# Patient Record
Sex: FEMALE | Race: ASIAN | NOT HISPANIC OR LATINO
[De-identification: names, ages, dates, MRNs, and addresses within clinical notes are randomized per-mention and may not be internally consistent; named-entity substitution may affect disease eponyms.]

---

## 2019-03-15 ENCOUNTER — RESULT REVIEW (OUTPATIENT)
Age: 29
End: 2019-03-15

## 2021-06-30 ENCOUNTER — APPOINTMENT (OUTPATIENT)
Dept: OBGYN | Facility: CLINIC | Age: 31
End: 2021-06-30
Payer: COMMERCIAL

## 2021-06-30 VITALS
DIASTOLIC BLOOD PRESSURE: 72 MMHG | SYSTOLIC BLOOD PRESSURE: 110 MMHG | HEIGHT: 66 IN | WEIGHT: 135 LBS | BODY MASS INDEX: 21.69 KG/M2

## 2021-06-30 DIAGNOSIS — N93.8 OTHER SPECIFIED ABNORMAL UTERINE AND VAGINAL BLEEDING: ICD-10-CM

## 2021-06-30 DIAGNOSIS — N84.1 POLYP OF CERVIX UTERI: ICD-10-CM

## 2021-06-30 DIAGNOSIS — Z01.411 ENCOUNTER FOR GYNECOLOGICAL EXAMINATION (GENERAL) (ROUTINE) WITH ABNORMAL FINDINGS: ICD-10-CM

## 2021-06-30 PROBLEM — Z00.00 ENCOUNTER FOR PREVENTIVE HEALTH EXAMINATION: Status: ACTIVE | Noted: 2021-06-30

## 2021-06-30 LAB
BILIRUB UR QL STRIP: NORMAL
CLARITY UR: CLEAR
COLLECTION METHOD: NORMAL
GLUCOSE UR-MCNC: NORMAL
HCG UR QL: 0.2 EU/DL
HGB UR QL STRIP.AUTO: NORMAL
KETONES UR-MCNC: NORMAL
LEUKOCYTE ESTERASE UR QL STRIP: NORMAL
NITRITE UR QL STRIP: NORMAL
PH UR STRIP: 8.5
PROT UR STRIP-MCNC: NORMAL
SP GR UR STRIP: 1.02

## 2021-06-30 PROCEDURE — 99395 PREV VISIT EST AGE 18-39: CPT | Mod: 25

## 2021-06-30 PROCEDURE — 99072 ADDL SUPL MATRL&STAF TM PHE: CPT

## 2021-06-30 PROCEDURE — 81003 URINALYSIS AUTO W/O SCOPE: CPT | Mod: QW

## 2021-06-30 PROCEDURE — 57500 BIOPSY OF CERVIX: CPT | Mod: 59

## 2021-07-05 LAB
25(OH)D3 SERPL-MCNC: 14.4 NG/ML
ALBUMIN SERPL ELPH-MCNC: 5 G/DL
ALP BLD-CCNC: 56 U/L
ALT SERPL-CCNC: 14 U/L
ANION GAP SERPL CALC-SCNC: 20 MMOL/L
AST SERPL-CCNC: 23 U/L
BASOPHILS # BLD AUTO: 0.04 K/UL
BASOPHILS NFR BLD AUTO: 0.5 %
BILIRUB SERPL-MCNC: 0.2 MG/DL
BUN SERPL-MCNC: 10 MG/DL
CALCIUM SERPL-MCNC: 9.7 MG/DL
CHLORIDE SERPL-SCNC: 102 MMOL/L
CHOLEST SERPL-MCNC: 165 MG/DL
CO2 SERPL-SCNC: 19 MMOL/L
CORE LAB BIOPSY: NORMAL
CREAT SERPL-MCNC: 0.76 MG/DL
CYTOLOGY CVX/VAG DOC THIN PREP: NORMAL
EOSINOPHIL # BLD AUTO: 0.09 K/UL
EOSINOPHIL NFR BLD AUTO: 1.2 %
ESTIMATED AVERAGE GLUCOSE: 108 MG/DL
FSH SERPL-MCNC: 4.7 IU/L
GLUCOSE SERPL-MCNC: 44 MG/DL
HBA1C MFR BLD HPLC: 5.4 %
HCT VFR BLD CALC: 40 %
HDLC SERPL-MCNC: 52 MG/DL
HGB BLD-MCNC: 12.2 G/DL
HPV HIGH+LOW RISK DNA PNL CVX: NOT DETECTED
IMM GRANULOCYTES NFR BLD AUTO: 0.3 %
LDLC SERPL CALC-MCNC: 99 MG/DL
LH SERPL-ACNC: 9.8 IU/L
LYMPHOCYTES # BLD AUTO: 1.81 K/UL
LYMPHOCYTES NFR BLD AUTO: 24.3 %
MAN DIFF?: NORMAL
MCHC RBC-ENTMCNC: 26.9 PG
MCHC RBC-ENTMCNC: 30.5 GM/DL
MCV RBC AUTO: 88.1 FL
MONOCYTES # BLD AUTO: 0.55 K/UL
MONOCYTES NFR BLD AUTO: 7.4 %
NEUTROPHILS # BLD AUTO: 4.95 K/UL
NEUTROPHILS NFR BLD AUTO: 66.3 %
NONHDLC SERPL-MCNC: 113 MG/DL
PLATELET # BLD AUTO: 267 K/UL
POTASSIUM SERPL-SCNC: 4.5 MMOL/L
PROLACTIN SERPL-MCNC: 15.8 NG/ML
PROT SERPL-MCNC: 7.5 G/DL
RBC # BLD: 4.54 M/UL
RBC # FLD: 13.9 %
SODIUM SERPL-SCNC: 140 MMOL/L
T4 FREE SERPL-MCNC: 1.5 NG/DL
TRIGL SERPL-MCNC: 67 MG/DL
TSH SERPL-ACNC: 0.78 UIU/ML
WBC # FLD AUTO: 7.46 K/UL

## 2021-07-09 ENCOUNTER — NON-APPOINTMENT (OUTPATIENT)
Age: 31
End: 2021-07-09

## 2021-07-09 DIAGNOSIS — E55.9 VITAMIN D DEFICIENCY, UNSPECIFIED: ICD-10-CM

## 2021-07-11 RX ORDER — CHOLECALCIFEROL (VITAMIN D3) 1250 MCG
1.25 MG CAPSULE ORAL
Qty: 8 | Refills: 0 | Status: ACTIVE | COMMUNITY
Start: 2021-07-09 | End: 1900-01-01

## 2021-07-14 ENCOUNTER — TRANSCRIPTION ENCOUNTER (OUTPATIENT)
Age: 31
End: 2021-07-14

## 2021-08-19 ENCOUNTER — APPOINTMENT (OUTPATIENT)
Dept: OBGYN | Facility: CLINIC | Age: 31
End: 2021-08-19
Payer: COMMERCIAL

## 2021-08-19 ENCOUNTER — ASOB RESULT (OUTPATIENT)
Age: 31
End: 2021-08-19

## 2021-08-19 VITALS — DIASTOLIC BLOOD PRESSURE: 70 MMHG | SYSTOLIC BLOOD PRESSURE: 110 MMHG

## 2021-08-19 DIAGNOSIS — N63.0 UNSPECIFIED LUMP IN UNSPECIFIED BREAST: ICD-10-CM

## 2021-08-19 PROCEDURE — 99213 OFFICE O/P EST LOW 20 MIN: CPT

## 2021-08-19 PROCEDURE — 76830 TRANSVAGINAL US NON-OB: CPT

## 2021-08-19 NOTE — PHYSICAL EXAM
[Examination Of The Breasts] : a normal appearance [Normal] : normal [Breast Palpation Diffuse Fibrous Tissue Bilateral] : fibrocystic changes [___cm] : a ~M [unfilled] ~Ucm subareolar mass was palpated [Superficial] : superficial [Soft] : soft [Rubbery] : rubbery [Tender] : tender [1:00] : in the 1:00 position [2:00] : in the 2:00 position [___cm Radial Distance from the Nipple] : [unfilled] ~Ucm radially from the nipple

## 2021-08-19 NOTE — HISTORY OF PRESENT ILLNESS
[FreeTextEntry1] : 32 yo LMP: 8/1/21. c/o of L breast mass that was tender x 1 week.  Pt thinks it may be related to her period. Denies redness, skin changes, denies nipple changes/discharge.\par \par Of note pt also had a TVUS due to her endometrial polyp:\par Ut 7.05x3.72x3.05\par EM: 7.97mm\par RO: WNL\par LO: WNL\par Normal TVUS\par \par

## 2021-08-19 NOTE — DISCUSSION/SUMMARY
[FreeTextEntry1] : #L breast mass\par -tender, mobile 0.5 cm L subareolar breast mass\par -Changes in nature, likely breast cyst in the setting of fibrocystic breasts\par -Diagnostic mammo and breast US rx\par -Tylenol/Motrin PRN for pain\par \par #h/o endometrial polyp\par -removed in office\par -no Aub\par -TVUS today wnl\par \par Pt to F/u in 1 year or PRN\par ART Meyer MD\par \par

## 2021-08-23 ENCOUNTER — APPOINTMENT (OUTPATIENT)
Dept: MAMMOGRAPHY | Facility: IMAGING CENTER | Age: 31
End: 2021-08-23
Payer: COMMERCIAL

## 2021-08-23 ENCOUNTER — RESULT REVIEW (OUTPATIENT)
Age: 31
End: 2021-08-23

## 2021-08-23 ENCOUNTER — OUTPATIENT (OUTPATIENT)
Dept: OUTPATIENT SERVICES | Facility: HOSPITAL | Age: 31
LOS: 1 days | End: 2021-08-23
Payer: COMMERCIAL

## 2021-08-23 ENCOUNTER — APPOINTMENT (OUTPATIENT)
Dept: ULTRASOUND IMAGING | Facility: IMAGING CENTER | Age: 31
End: 2021-08-23
Payer: COMMERCIAL

## 2021-08-23 DIAGNOSIS — N63.0 UNSPECIFIED LUMP IN UNSPECIFIED BREAST: ICD-10-CM

## 2021-08-23 PROCEDURE — 77066 DX MAMMO INCL CAD BI: CPT | Mod: 26

## 2021-08-23 PROCEDURE — 77066 DX MAMMO INCL CAD BI: CPT

## 2021-08-23 PROCEDURE — 76641 ULTRASOUND BREAST COMPLETE: CPT

## 2021-08-23 PROCEDURE — G0279: CPT | Mod: 26

## 2021-08-23 PROCEDURE — 76641 ULTRASOUND BREAST COMPLETE: CPT | Mod: 26,50

## 2021-08-23 PROCEDURE — G0279: CPT

## 2021-09-27 ENCOUNTER — RX RENEWAL (OUTPATIENT)
Age: 31
End: 2021-09-27

## 2021-09-29 ENCOUNTER — RX RENEWAL (OUTPATIENT)
Age: 31
End: 2021-09-29

## 2021-10-18 ENCOUNTER — FORM ENCOUNTER (OUTPATIENT)
Age: 31
End: 2021-10-18

## 2021-10-19 ENCOUNTER — TRANSCRIPTION ENCOUNTER (OUTPATIENT)
Age: 31
End: 2021-10-19

## 2021-10-20 ENCOUNTER — OUTPATIENT (OUTPATIENT)
Dept: INPATIENT UNIT | Facility: HOSPITAL | Age: 31
LOS: 1 days | End: 2021-10-20
Payer: COMMERCIAL

## 2021-10-20 ENCOUNTER — APPOINTMENT (OUTPATIENT)
Dept: DISASTER EMERGENCY | Facility: HOSPITAL | Age: 31
End: 2021-10-20

## 2021-10-20 VITALS
OXYGEN SATURATION: 100 % | TEMPERATURE: 98 F | SYSTOLIC BLOOD PRESSURE: 105 MMHG | DIASTOLIC BLOOD PRESSURE: 71 MMHG | HEART RATE: 67 BPM | RESPIRATION RATE: 18 BRPM

## 2021-10-20 VITALS
OXYGEN SATURATION: 100 % | HEART RATE: 85 BPM | HEIGHT: 65 IN | DIASTOLIC BLOOD PRESSURE: 70 MMHG | TEMPERATURE: 98 F | SYSTOLIC BLOOD PRESSURE: 118 MMHG | WEIGHT: 151.9 LBS | RESPIRATION RATE: 19 BRPM

## 2021-10-20 DIAGNOSIS — U07.1 COVID-19: ICD-10-CM

## 2021-10-20 PROCEDURE — M0243: CPT

## 2021-10-20 RX ORDER — SODIUM CHLORIDE 9 MG/ML
250 INJECTION INTRAMUSCULAR; INTRAVENOUS; SUBCUTANEOUS
Refills: 0 | Status: COMPLETED | OUTPATIENT
Start: 2021-10-20 | End: 2021-10-20

## 2021-10-20 RX ADMIN — SODIUM CHLORIDE 310 MILLILITER(S): 9 INJECTION INTRAMUSCULAR; INTRAVENOUS; SUBCUTANEOUS at 10:50

## 2021-10-20 NOTE — CHART NOTE - NSCHARTNOTEFT_GEN_A_CORE
CC: Monoclonal Antibody Infusion - COVID 19 Positive      History: Patient presents for infusion of monoclonal antibody infusion. Patient has been screened and was deemed to be a candidate.    Date tested positive: 10/18/21      COVID Symptoms: Yes  Date of onset: 10/14/21  * Fever-  * Cough-  * General Malaise-  * Headache-  * Taste / Smell changes-   * GI symptoms      Risk Profile includes:   * OTHER: BMI >25, race/ethnicity      Vaccination Status: Yes  Date received 2nd dose: Documented by RN      No Known Allergies            PMHx:  Infection due to severe acute respiratory syndrome coronavirus 2 (SARS-CoV-2)    COVID-19          T(C): 36.9 (10-20-21 @ 11:11), Max: 36.9 (10-20-21 @ 11:05)  HR: 68 (10-20-21 @ 11:11) (68 - 85)  BP: 96/69 (10-20-21 @ 11:11) (94/68 - 118/70)  RR: 18 (10-20-21 @ 11:11) (18 - 19)  SpO2: 100% (10-20-21 @ 11:11) (100% - 100%)      PE- Well developed, well-nourish, resting comfortably in NAD.   Neuro- A&Ox3, no gross sensory deficits to light touch or motor weaknesses.   Vasc- No peripheral edema or venous stasis noted.  Skin- No ecchymosis or bleeding.  MS- No bony tenderness       ASSESSMENT:  Pt is a 31y year old Female COVID positive and symptomatic who was referred to the infusion center for Monoclonal antibody infusion (Regeneron).      PLAN:  - infusion procedure explained to patient   - Consent for monoclonal antibody infusion obtained   - Risk & benefits discussed/all questions answered  - infusion of Regeneron   - will observe patient for one hour post infusion  and then if stable discharge home with outpatient follow up as planned by PMD.  - Pregnancy waiver signed    POST INFUSION ASSESSMENT:   DISCHARGE at approximately 1230  hours    - Patient tolerated infusion well denies complaints of chest pain, SOB, dizziness or palpitations  - VSS for discharge home  - D/C instructions given/ fact sheet included.  - Patient to follow-up with PCP as needed.

## 2021-10-21 ENCOUNTER — TRANSCRIPTION ENCOUNTER (OUTPATIENT)
Age: 31
End: 2021-10-21

## 2023-07-05 ENCOUNTER — APPOINTMENT (OUTPATIENT)
Dept: OBGYN | Facility: CLINIC | Age: 33
End: 2023-07-05
Payer: COMMERCIAL

## 2023-07-05 ENCOUNTER — ASOB RESULT (OUTPATIENT)
Age: 33
End: 2023-07-05

## 2023-07-05 VITALS
WEIGHT: 157 LBS | HEIGHT: 66 IN | DIASTOLIC BLOOD PRESSURE: 77 MMHG | SYSTOLIC BLOOD PRESSURE: 115 MMHG | BODY MASS INDEX: 25.23 KG/M2

## 2023-07-05 DIAGNOSIS — Z3A.10 10 WEEKS GESTATION OF PREGNANCY: ICD-10-CM

## 2023-07-05 DIAGNOSIS — Z34.90 ENCOUNTER FOR SUPERVISION OF NORMAL PREGNANCY, UNSPECIFIED, UNSPECIFIED TRIMESTER: ICD-10-CM

## 2023-07-05 PROCEDURE — 0500F INITIAL PRENATAL CARE VISIT: CPT

## 2023-07-05 PROCEDURE — 36415 COLL VENOUS BLD VENIPUNCTURE: CPT

## 2023-07-05 PROCEDURE — 76801 OB US < 14 WKS SINGLE FETUS: CPT

## 2023-07-06 LAB
ABO + RH PNL BLD: NORMAL
ALBUMIN SERPL ELPH-MCNC: 5 G/DL
ALP BLD-CCNC: 48 U/L
ALT SERPL-CCNC: 14 U/L
ANION GAP SERPL CALC-SCNC: 17 MMOL/L
AST SERPL-CCNC: 19 U/L
BILIRUB SERPL-MCNC: 0.3 MG/DL
BLD GP AB SCN SERPL QL: NORMAL
BUN SERPL-MCNC: 6 MG/DL
C TRACH RRNA SPEC QL NAA+PROBE: NOT DETECTED
CALCIUM SERPL-MCNC: 9.6 MG/DL
CHLORIDE SERPL-SCNC: 98 MMOL/L
CO2 SERPL-SCNC: 19 MMOL/L
CREAT SERPL-MCNC: 0.54 MG/DL
EGFR: 125 ML/MIN/1.73M2
GLUCOSE SERPL-MCNC: 63 MG/DL
HBV SURFACE AG SER QL: NONREACTIVE
HCV AB SER QL: NONREACTIVE
HCV S/CO RATIO: 0.11 S/CO
HGB A MFR BLD: 97.3 %
HGB A2 MFR BLD: 2.7 %
HGB FRACT BLD-IMP: NORMAL
HIV1+2 AB SPEC QL IA.RAPID: NONREACTIVE
HPV HIGH+LOW RISK DNA PNL CVX: NOT DETECTED
MEV IGG FLD QL IA: 232 AU/ML
MEV IGG+IGM SER-IMP: POSITIVE
N GONORRHOEA RRNA SPEC QL NAA+PROBE: NOT DETECTED
POTASSIUM SERPL-SCNC: 4.2 MMOL/L
PROT SERPL-MCNC: 7.6 G/DL
RUBV IGG FLD-ACNC: 24.7 INDEX
RUBV IGG SER-IMP: POSITIVE
SODIUM SERPL-SCNC: 134 MMOL/L
SOURCE TP AMPLIFICATION: NORMAL
T PALLIDUM AB SER QL IA: NEGATIVE
T4 FREE SERPL-MCNC: 1.6 NG/DL
TSH SERPL-ACNC: 0.39 UIU/ML
VZV AB TITR SER: POSITIVE
VZV IGG SER IF-ACNC: 3602 INDEX

## 2023-07-08 LAB
BACTERIA UR CULT: NORMAL
CYTOLOGY CVX/VAG DOC THIN PREP: NORMAL
LEAD BLD-MCNC: 1.1 UG/DL

## 2023-07-18 ENCOUNTER — NON-APPOINTMENT (OUTPATIENT)
Age: 33
End: 2023-07-18

## 2023-07-18 ENCOUNTER — ASOB RESULT (OUTPATIENT)
Age: 33
End: 2023-07-18

## 2023-07-18 ENCOUNTER — APPOINTMENT (OUTPATIENT)
Dept: OBGYN | Facility: CLINIC | Age: 33
End: 2023-07-18
Payer: COMMERCIAL

## 2023-07-18 VITALS — BODY MASS INDEX: 25.34 KG/M2 | WEIGHT: 157 LBS | DIASTOLIC BLOOD PRESSURE: 71 MMHG | SYSTOLIC BLOOD PRESSURE: 118 MMHG

## 2023-07-18 DIAGNOSIS — Z3A.12 12 WEEKS GESTATION OF PREGNANCY: ICD-10-CM

## 2023-07-18 PROCEDURE — 76813 OB US NUCHAL MEAS 1 GEST: CPT

## 2023-07-18 PROCEDURE — 0502F SUBSEQUENT PRENATAL CARE: CPT

## 2023-07-21 ENCOUNTER — NON-APPOINTMENT (OUTPATIENT)
Age: 33
End: 2023-07-21

## 2023-08-15 ENCOUNTER — APPOINTMENT (OUTPATIENT)
Dept: OBGYN | Facility: CLINIC | Age: 33
End: 2023-08-15
Payer: COMMERCIAL

## 2023-08-15 ENCOUNTER — ASOB RESULT (OUTPATIENT)
Age: 33
End: 2023-08-15

## 2023-08-15 VITALS — SYSTOLIC BLOOD PRESSURE: 104 MMHG | DIASTOLIC BLOOD PRESSURE: 54 MMHG | WEIGHT: 158 LBS | BODY MASS INDEX: 25.5 KG/M2

## 2023-08-15 DIAGNOSIS — Z3A.16 16 WEEKS GESTATION OF PREGNANCY: ICD-10-CM

## 2023-08-15 PROCEDURE — 76815 OB US LIMITED FETUS(S): CPT

## 2023-08-15 PROCEDURE — 0502F SUBSEQUENT PRENATAL CARE: CPT

## 2023-08-28 LAB
AFP MOM: 0.69
AFP VALUE: 22.7 NG/ML
ALPHA FETOPROTEIN SERUM COMMENT: NORMAL
ALPHA FETOPROTEIN SERUM INTERPRETATION: NORMAL
ALPHA FETOPROTEIN SERUM RESULTS: NORMAL
ALPHA FETOPROTEIN SERUM TEST RESULTS: NORMAL
GESTATIONAL AGE BASED ON: NORMAL
GESTATIONAL AGE ON COLLECTION DATE: 15.9 WEEKS
INSULIN DEP DIABETES: NO
MATERNAL AGE AT EDD AFP: 32.9 YR
MULTIPLE GESTATION: NO
OSBR RISK 1 IN: NORMAL
RACE: NORMAL
WEIGHT AFP: 158 LBS

## 2023-09-15 ENCOUNTER — ASOB RESULT (OUTPATIENT)
Age: 33
End: 2023-09-15

## 2023-09-15 ENCOUNTER — APPOINTMENT (OUTPATIENT)
Dept: OBGYN | Facility: CLINIC | Age: 33
End: 2023-09-15
Payer: COMMERCIAL

## 2023-09-15 VITALS — BODY MASS INDEX: 25.99 KG/M2 | SYSTOLIC BLOOD PRESSURE: 101 MMHG | DIASTOLIC BLOOD PRESSURE: 61 MMHG | WEIGHT: 161 LBS

## 2023-09-15 PROCEDURE — 90714 TD VACC NO PRESV 7 YRS+ IM: CPT

## 2023-09-15 PROCEDURE — 76805 OB US >/= 14 WKS SNGL FETUS: CPT

## 2023-09-15 PROCEDURE — 90471 IMMUNIZATION ADMIN: CPT

## 2023-09-15 PROCEDURE — 90472 IMMUNIZATION ADMIN EACH ADD: CPT

## 2023-09-15 PROCEDURE — 90686 IIV4 VACC NO PRSV 0.5 ML IM: CPT

## 2023-09-15 PROCEDURE — 0502F SUBSEQUENT PRENATAL CARE: CPT

## 2023-09-18 ENCOUNTER — NON-APPOINTMENT (OUTPATIENT)
Age: 33
End: 2023-09-18

## 2023-10-13 ENCOUNTER — APPOINTMENT (OUTPATIENT)
Dept: OBGYN | Facility: CLINIC | Age: 33
End: 2023-10-13
Payer: COMMERCIAL

## 2023-10-13 ENCOUNTER — ASOB RESULT (OUTPATIENT)
Age: 33
End: 2023-10-13

## 2023-10-13 VITALS — WEIGHT: 168 LBS | SYSTOLIC BLOOD PRESSURE: 113 MMHG | BODY MASS INDEX: 27.12 KG/M2 | DIASTOLIC BLOOD PRESSURE: 71 MMHG

## 2023-10-13 PROCEDURE — 0502F SUBSEQUENT PRENATAL CARE: CPT

## 2023-10-13 PROCEDURE — 76816 OB US FOLLOW-UP PER FETUS: CPT

## 2023-11-06 ENCOUNTER — APPOINTMENT (OUTPATIENT)
Dept: OBGYN | Facility: CLINIC | Age: 33
End: 2023-11-06
Payer: COMMERCIAL

## 2023-11-06 ENCOUNTER — ASOB RESULT (OUTPATIENT)
Age: 33
End: 2023-11-06

## 2023-11-06 VITALS
HEIGHT: 66 IN | DIASTOLIC BLOOD PRESSURE: 72 MMHG | SYSTOLIC BLOOD PRESSURE: 113 MMHG | BODY MASS INDEX: 27.32 KG/M2 | WEIGHT: 170 LBS

## 2023-11-06 DIAGNOSIS — Z3A.28 28 WEEKS GESTATION OF PREGNANCY: ICD-10-CM

## 2023-11-06 DIAGNOSIS — Z34.03 ENCOUNTER FOR SUPERVISION OF NORMAL FIRST PREGNANCY, THIRD TRIMESTER: ICD-10-CM

## 2023-11-06 DIAGNOSIS — Z23 ENCOUNTER FOR IMMUNIZATION: ICD-10-CM

## 2023-11-06 LAB
BASOPHILS # BLD AUTO: 0.02 K/UL
BASOPHILS NFR BLD AUTO: 0.2 %
EOSINOPHIL # BLD AUTO: 0.07 K/UL
EOSINOPHIL NFR BLD AUTO: 0.8 %
GLUCOSE 1H P 50 G GLC PO SERPL-MCNC: 103 MG/DL
HCT VFR BLD CALC: 39 %
HGB BLD-MCNC: 12.6 G/DL
IMM GRANULOCYTES NFR BLD AUTO: 0.7 %
LYMPHOCYTES # BLD AUTO: 1.2 K/UL
LYMPHOCYTES NFR BLD AUTO: 13.3 %
MAN DIFF?: NORMAL
MCHC RBC-ENTMCNC: 29.7 PG
MCHC RBC-ENTMCNC: 32.3 GM/DL
MCV RBC AUTO: 92 FL
MONOCYTES # BLD AUTO: 0.36 K/UL
MONOCYTES NFR BLD AUTO: 4 %
NEUTROPHILS # BLD AUTO: 7.31 K/UL
NEUTROPHILS NFR BLD AUTO: 81 %
PLATELET # BLD AUTO: 238 K/UL
RBC # BLD: 4.24 M/UL
RBC # FLD: 13.2 %
WBC # FLD AUTO: 9.02 K/UL

## 2023-11-06 PROCEDURE — 90471 IMMUNIZATION ADMIN: CPT

## 2023-11-06 PROCEDURE — 0502F SUBSEQUENT PRENATAL CARE: CPT

## 2023-11-06 PROCEDURE — 90715 TDAP VACCINE 7 YRS/> IM: CPT

## 2023-11-06 PROCEDURE — 76816 OB US FOLLOW-UP PER FETUS: CPT

## 2023-11-06 PROCEDURE — 36415 COLL VENOUS BLD VENIPUNCTURE: CPT

## 2023-11-07 LAB
BLD GP AB SCN SERPL QL: NORMAL
HIV1+2 AB SPEC QL IA.RAPID: NONREACTIVE

## 2023-11-10 ENCOUNTER — NON-APPOINTMENT (OUTPATIENT)
Age: 33
End: 2023-11-10

## 2023-12-08 ENCOUNTER — ASOB RESULT (OUTPATIENT)
Age: 33
End: 2023-12-08

## 2023-12-08 ENCOUNTER — APPOINTMENT (OUTPATIENT)
Dept: OBGYN | Facility: CLINIC | Age: 33
End: 2023-12-08
Payer: COMMERCIAL

## 2023-12-08 VITALS — WEIGHT: 176 LBS | DIASTOLIC BLOOD PRESSURE: 81 MMHG | BODY MASS INDEX: 28.41 KG/M2 | SYSTOLIC BLOOD PRESSURE: 119 MMHG

## 2023-12-08 DIAGNOSIS — Z3A.32 32 WEEKS GESTATION OF PREGNANCY: ICD-10-CM

## 2023-12-08 PROCEDURE — 76816 OB US FOLLOW-UP PER FETUS: CPT

## 2023-12-08 PROCEDURE — 76819 FETAL BIOPHYS PROFIL W/O NST: CPT

## 2023-12-08 PROCEDURE — 0502F SUBSEQUENT PRENATAL CARE: CPT

## 2023-12-20 ENCOUNTER — NON-APPOINTMENT (OUTPATIENT)
Age: 33
End: 2023-12-20

## 2023-12-20 ENCOUNTER — APPOINTMENT (OUTPATIENT)
Dept: OBGYN | Facility: CLINIC | Age: 33
End: 2023-12-20
Payer: COMMERCIAL

## 2023-12-20 VITALS
HEIGHT: 66 IN | WEIGHT: 178 LBS | DIASTOLIC BLOOD PRESSURE: 81 MMHG | BODY MASS INDEX: 28.61 KG/M2 | SYSTOLIC BLOOD PRESSURE: 120 MMHG

## 2023-12-20 DIAGNOSIS — Z3A.34 34 WEEKS GESTATION OF PREGNANCY: ICD-10-CM

## 2023-12-20 PROCEDURE — 0502F SUBSEQUENT PRENATAL CARE: CPT

## 2023-12-29 ENCOUNTER — ASOB RESULT (OUTPATIENT)
Age: 33
End: 2023-12-29

## 2023-12-29 ENCOUNTER — NON-APPOINTMENT (OUTPATIENT)
Age: 33
End: 2023-12-29

## 2023-12-29 ENCOUNTER — APPOINTMENT (OUTPATIENT)
Dept: OBGYN | Facility: CLINIC | Age: 33
End: 2023-12-29
Payer: COMMERCIAL

## 2023-12-29 VITALS — BODY MASS INDEX: 29.05 KG/M2 | WEIGHT: 180 LBS | DIASTOLIC BLOOD PRESSURE: 79 MMHG | SYSTOLIC BLOOD PRESSURE: 123 MMHG

## 2023-12-29 DIAGNOSIS — Z3A.35 35 WEEKS GESTATION OF PREGNANCY: ICD-10-CM

## 2023-12-29 PROCEDURE — 76819 FETAL BIOPHYS PROFIL W/O NST: CPT

## 2023-12-29 PROCEDURE — 76816 OB US FOLLOW-UP PER FETUS: CPT

## 2023-12-29 PROCEDURE — 0502F SUBSEQUENT PRENATAL CARE: CPT

## 2024-01-04 ENCOUNTER — APPOINTMENT (OUTPATIENT)
Dept: OBGYN | Facility: CLINIC | Age: 34
End: 2024-01-04

## 2024-01-05 ENCOUNTER — APPOINTMENT (OUTPATIENT)
Dept: OBGYN | Facility: CLINIC | Age: 34
End: 2024-01-05
Payer: COMMERCIAL

## 2024-01-05 ENCOUNTER — ASOB RESULT (OUTPATIENT)
Age: 34
End: 2024-01-05

## 2024-01-05 VITALS — SYSTOLIC BLOOD PRESSURE: 120 MMHG | WEIGHT: 180 LBS | BODY MASS INDEX: 29.05 KG/M2 | DIASTOLIC BLOOD PRESSURE: 78 MMHG

## 2024-01-05 DIAGNOSIS — Z3A.36 36 WEEKS GESTATION OF PREGNANCY: ICD-10-CM

## 2024-01-05 DIAGNOSIS — N89.8 OTHER SPECIFIED NONINFLAMMATORY DISORDERS OF VAGINA: ICD-10-CM

## 2024-01-05 PROCEDURE — 76816 OB US FOLLOW-UP PER FETUS: CPT

## 2024-01-05 PROCEDURE — 0502F SUBSEQUENT PRENATAL CARE: CPT

## 2024-01-05 PROCEDURE — 76819 FETAL BIOPHYS PROFIL W/O NST: CPT

## 2024-01-07 ENCOUNTER — NON-APPOINTMENT (OUTPATIENT)
Age: 34
End: 2024-01-07

## 2024-01-07 LAB
GP B STREP DNA SPEC QL NAA+PROBE: DETECTED
SOURCE GBS: NORMAL

## 2024-01-09 ENCOUNTER — NON-APPOINTMENT (OUTPATIENT)
Age: 34
End: 2024-01-09

## 2024-01-09 LAB
BV BACTERIA RRNA VAG QL NAA+PROBE: DETECTED
C GLABRATA RNA VAG QL NAA+PROBE: NOT DETECTED
CANDIDA RRNA VAG QL PROBE: NOT DETECTED
T VAGINALIS RRNA SPEC QL NAA+PROBE: NOT DETECTED

## 2024-01-10 DIAGNOSIS — N76.0 ACUTE VAGINITIS: ICD-10-CM

## 2024-01-10 DIAGNOSIS — B96.89 ACUTE VAGINITIS: ICD-10-CM

## 2024-01-11 RX ORDER — METRONIDAZOLE 500 MG/1
500 TABLET ORAL TWICE DAILY
Qty: 14 | Refills: 0 | Status: ACTIVE | COMMUNITY
Start: 2024-01-10 | End: 1900-01-01

## 2024-01-12 ENCOUNTER — APPOINTMENT (OUTPATIENT)
Dept: OBGYN | Facility: CLINIC | Age: 34
End: 2024-01-12
Payer: COMMERCIAL

## 2024-01-12 ENCOUNTER — ASOB RESULT (OUTPATIENT)
Age: 34
End: 2024-01-12

## 2024-01-12 VITALS — BODY MASS INDEX: 29.54 KG/M2 | SYSTOLIC BLOOD PRESSURE: 122 MMHG | WEIGHT: 183 LBS | DIASTOLIC BLOOD PRESSURE: 82 MMHG

## 2024-01-12 DIAGNOSIS — Z3A.37 37 WEEKS GESTATION OF PREGNANCY: ICD-10-CM

## 2024-01-12 PROCEDURE — 76816 OB US FOLLOW-UP PER FETUS: CPT

## 2024-01-12 PROCEDURE — 0502F SUBSEQUENT PRENATAL CARE: CPT

## 2024-01-17 ENCOUNTER — APPOINTMENT (OUTPATIENT)
Dept: OBGYN | Facility: CLINIC | Age: 34
End: 2024-01-17

## 2024-01-19 ENCOUNTER — APPOINTMENT (OUTPATIENT)
Dept: OBGYN | Facility: CLINIC | Age: 34
End: 2024-01-19
Payer: COMMERCIAL

## 2024-01-19 ENCOUNTER — ASOB RESULT (OUTPATIENT)
Age: 34
End: 2024-01-19

## 2024-01-19 VITALS
WEIGHT: 184 LBS | SYSTOLIC BLOOD PRESSURE: 120 MMHG | DIASTOLIC BLOOD PRESSURE: 82 MMHG | BODY MASS INDEX: 29.57 KG/M2 | HEIGHT: 66 IN

## 2024-01-19 DIAGNOSIS — Z34.93 ENCOUNTER FOR SUPERVISION OF NORMAL PREGNANCY, UNSPECIFIED, THIRD TRIMESTER: ICD-10-CM

## 2024-01-19 DIAGNOSIS — Z3A.38 38 WEEKS GESTATION OF PREGNANCY: ICD-10-CM

## 2024-01-19 PROCEDURE — 76819 FETAL BIOPHYS PROFIL W/O NST: CPT

## 2024-01-19 PROCEDURE — 0502F SUBSEQUENT PRENATAL CARE: CPT

## 2024-01-26 ENCOUNTER — ASOB RESULT (OUTPATIENT)
Age: 34
End: 2024-01-26

## 2024-01-26 ENCOUNTER — APPOINTMENT (OUTPATIENT)
Dept: OBGYN | Facility: CLINIC | Age: 34
End: 2024-01-26

## 2024-01-26 ENCOUNTER — INPATIENT (INPATIENT)
Facility: HOSPITAL | Age: 34
LOS: 1 days | Discharge: HOME | End: 2024-01-28
Attending: SPECIALIST | Admitting: SPECIALIST
Payer: COMMERCIAL

## 2024-01-26 ENCOUNTER — APPOINTMENT (OUTPATIENT)
Dept: OBGYN | Facility: CLINIC | Age: 34
End: 2024-01-26
Payer: COMMERCIAL

## 2024-01-26 VITALS — HEART RATE: 82 BPM | SYSTOLIC BLOOD PRESSURE: 132 MMHG | DIASTOLIC BLOOD PRESSURE: 77 MMHG | OXYGEN SATURATION: 100 %

## 2024-01-26 VITALS — DIASTOLIC BLOOD PRESSURE: 88 MMHG | BODY MASS INDEX: 29.86 KG/M2 | SYSTOLIC BLOOD PRESSURE: 136 MMHG | WEIGHT: 185 LBS

## 2024-01-26 DIAGNOSIS — O26.899 OTHER SPECIFIED PREGNANCY RELATED CONDITIONS, UNSPECIFIED TRIMESTER: ICD-10-CM

## 2024-01-26 DIAGNOSIS — Z34.80 ENCOUNTER FOR SUPERVISION OF OTHER NORMAL PREGNANCY, UNSPECIFIED TRIMESTER: ICD-10-CM

## 2024-01-26 DIAGNOSIS — Z92.89 PERSONAL HISTORY OF OTHER MEDICAL TREATMENT: Chronic | ICD-10-CM

## 2024-01-26 DIAGNOSIS — Z3A.39 39 WEEKS GESTATION OF PREGNANCY: ICD-10-CM

## 2024-01-26 DIAGNOSIS — O13.9 GESTATIONAL [PREGNANCY-INDUCED] HYPERTENSION W/OUT SIGNIFICANT PROTEINURIA, UNSPECIFIED TRIMESTER: ICD-10-CM

## 2024-01-26 LAB
ALBUMIN SERPL ELPH-MCNC: 3.6 G/DL — SIGNIFICANT CHANGE UP (ref 3.3–5)
ALP SERPL-CCNC: 161 U/L — HIGH (ref 40–120)
ALT FLD-CCNC: 15 U/L — SIGNIFICANT CHANGE UP (ref 10–45)
ANION GAP SERPL CALC-SCNC: 10 MMOL/L — SIGNIFICANT CHANGE UP (ref 5–17)
APPEARANCE UR: CLEAR — SIGNIFICANT CHANGE UP
APTT BLD: 26.9 SEC — SIGNIFICANT CHANGE UP (ref 24.5–35.6)
AST SERPL-CCNC: 16 U/L — SIGNIFICANT CHANGE UP (ref 10–40)
BACTERIA # UR AUTO: NEGATIVE /HPF — SIGNIFICANT CHANGE UP
BASOPHILS # BLD AUTO: 0.02 K/UL — SIGNIFICANT CHANGE UP (ref 0–0.2)
BASOPHILS NFR BLD AUTO: 0.2 % — SIGNIFICANT CHANGE UP (ref 0–2)
BILIRUB SERPL-MCNC: 0.2 MG/DL — SIGNIFICANT CHANGE UP (ref 0.2–1.2)
BILIRUB UR-MCNC: NEGATIVE — SIGNIFICANT CHANGE UP
BUN SERPL-MCNC: 7 MG/DL — SIGNIFICANT CHANGE UP (ref 7–23)
CALCIUM SERPL-MCNC: 8.9 MG/DL — SIGNIFICANT CHANGE UP (ref 8.4–10.5)
CAST: 0 /LPF — SIGNIFICANT CHANGE UP (ref 0–4)
CHLORIDE SERPL-SCNC: 105 MMOL/L — SIGNIFICANT CHANGE UP (ref 96–108)
CO2 SERPL-SCNC: 22 MMOL/L — SIGNIFICANT CHANGE UP (ref 22–31)
COLOR SPEC: YELLOW — SIGNIFICANT CHANGE UP
CREAT ?TM UR-MCNC: 66 MG/DL — SIGNIFICANT CHANGE UP
CREAT SERPL-MCNC: 0.55 MG/DL — SIGNIFICANT CHANGE UP (ref 0.5–1.3)
DIFF PNL FLD: ABNORMAL
EGFR: 125 ML/MIN/1.73M2 — SIGNIFICANT CHANGE UP
EOSINOPHIL # BLD AUTO: 0.02 K/UL — SIGNIFICANT CHANGE UP (ref 0–0.5)
EOSINOPHIL NFR BLD AUTO: 0.2 % — SIGNIFICANT CHANGE UP (ref 0–6)
FIBRINOGEN PPP-MCNC: 387 MG/DL — SIGNIFICANT CHANGE UP (ref 200–445)
GLUCOSE SERPL-MCNC: 87 MG/DL — SIGNIFICANT CHANGE UP (ref 70–99)
GLUCOSE UR QL: 100 MG/DL
HCT VFR BLD CALC: 36.1 % — SIGNIFICANT CHANGE UP (ref 34.5–45)
HGB BLD-MCNC: 12.1 G/DL — SIGNIFICANT CHANGE UP (ref 11.5–15.5)
IMM GRANULOCYTES NFR BLD AUTO: 0.6 % — SIGNIFICANT CHANGE UP (ref 0–0.9)
INR BLD: 0.9 RATIO — SIGNIFICANT CHANGE UP (ref 0.85–1.18)
KETONES UR-MCNC: NEGATIVE MG/DL — SIGNIFICANT CHANGE UP
LDH SERPL L TO P-CCNC: 128 U/L — SIGNIFICANT CHANGE UP (ref 50–242)
LEUKOCYTE ESTERASE UR-ACNC: ABNORMAL
LYMPHOCYTES # BLD AUTO: 1.39 K/UL — SIGNIFICANT CHANGE UP (ref 1–3.3)
LYMPHOCYTES # BLD AUTO: 12.9 % — LOW (ref 13–44)
MCHC RBC-ENTMCNC: 30 PG — SIGNIFICANT CHANGE UP (ref 27–34)
MCHC RBC-ENTMCNC: 33.5 GM/DL — SIGNIFICANT CHANGE UP (ref 32–36)
MCV RBC AUTO: 89.6 FL — SIGNIFICANT CHANGE UP (ref 80–100)
MONOCYTES # BLD AUTO: 0.57 K/UL — SIGNIFICANT CHANGE UP (ref 0–0.9)
MONOCYTES NFR BLD AUTO: 5.3 % — SIGNIFICANT CHANGE UP (ref 2–14)
NEUTROPHILS # BLD AUTO: 8.72 K/UL — HIGH (ref 1.8–7.4)
NEUTROPHILS NFR BLD AUTO: 80.8 % — HIGH (ref 43–77)
NITRITE UR-MCNC: NEGATIVE — SIGNIFICANT CHANGE UP
NRBC # BLD: 0 /100 WBCS — SIGNIFICANT CHANGE UP (ref 0–0)
PH UR: 7 — SIGNIFICANT CHANGE UP (ref 5–8)
PLATELET # BLD AUTO: 206 K/UL — SIGNIFICANT CHANGE UP (ref 150–400)
POTASSIUM SERPL-MCNC: 4.1 MMOL/L — SIGNIFICANT CHANGE UP (ref 3.5–5.3)
POTASSIUM SERPL-SCNC: 4.1 MMOL/L — SIGNIFICANT CHANGE UP (ref 3.5–5.3)
PROT ?TM UR-MCNC: 8 MG/DL — SIGNIFICANT CHANGE UP (ref 0–12)
PROT SERPL-MCNC: 6.3 G/DL — SIGNIFICANT CHANGE UP (ref 6–8.3)
PROT UR-MCNC: NEGATIVE MG/DL — SIGNIFICANT CHANGE UP
PROT/CREAT UR-RTO: 0.1 RATIO — SIGNIFICANT CHANGE UP (ref 0–0.2)
PROTHROM AB SERPL-ACNC: 9.5 SEC — SIGNIFICANT CHANGE UP (ref 9.5–13)
RBC # BLD: 4.03 M/UL — SIGNIFICANT CHANGE UP (ref 3.8–5.2)
RBC # FLD: 13.2 % — SIGNIFICANT CHANGE UP (ref 10.3–14.5)
RBC CASTS # UR COMP ASSIST: 5 /HPF — HIGH (ref 0–4)
REVIEW: SIGNIFICANT CHANGE UP
SODIUM SERPL-SCNC: 137 MMOL/L — SIGNIFICANT CHANGE UP (ref 135–145)
SP GR SPEC: 1.01 — SIGNIFICANT CHANGE UP (ref 1–1.03)
SQUAMOUS # UR AUTO: 2 /HPF — SIGNIFICANT CHANGE UP (ref 0–5)
URATE SERPL-MCNC: 5 MG/DL — SIGNIFICANT CHANGE UP (ref 2.5–7)
UROBILINOGEN FLD QL: 0.2 MG/DL — SIGNIFICANT CHANGE UP (ref 0.2–1)
WBC # BLD: 10.78 K/UL — HIGH (ref 3.8–10.5)
WBC # FLD AUTO: 10.78 K/UL — HIGH (ref 3.8–10.5)
WBC UR QL: 3 /HPF — SIGNIFICANT CHANGE UP (ref 0–5)

## 2024-01-26 PROCEDURE — 59409 OBSTETRICAL CARE: CPT

## 2024-01-26 PROCEDURE — 0502F SUBSEQUENT PRENATAL CARE: CPT

## 2024-01-26 RX ORDER — OXYTOCIN 10 UNIT/ML
4 VIAL (ML) INJECTION
Qty: 30 | Refills: 0 | Status: DISCONTINUED | OUTPATIENT
Start: 2024-01-26 | End: 2024-01-26

## 2024-01-26 RX ORDER — OXYTOCIN 10 UNIT/ML
333.33 VIAL (ML) INJECTION
Qty: 20 | Refills: 0 | Status: DISCONTINUED | OUTPATIENT
Start: 2024-01-26 | End: 2024-01-28

## 2024-01-26 RX ORDER — OXYTOCIN 10 UNIT/ML
333.33 VIAL (ML) INJECTION
Qty: 20 | Refills: 0 | Status: DISCONTINUED | OUTPATIENT
Start: 2024-01-26 | End: 2024-01-26

## 2024-01-26 RX ORDER — AER TRAVELER 0.5 G/1
1 SOLUTION RECTAL; TOPICAL EVERY 4 HOURS
Refills: 0 | Status: DISCONTINUED | OUTPATIENT
Start: 2024-01-26 | End: 2024-01-28

## 2024-01-26 RX ORDER — MAGNESIUM HYDROXIDE 400 MG/1
30 TABLET, CHEWABLE ORAL
Refills: 0 | Status: DISCONTINUED | OUTPATIENT
Start: 2024-01-26 | End: 2024-01-28

## 2024-01-26 RX ORDER — CHLORHEXIDINE GLUCONATE 213 G/1000ML
1 SOLUTION TOPICAL DAILY
Refills: 0 | Status: DISCONTINUED | OUTPATIENT
Start: 2024-01-26 | End: 2024-01-26

## 2024-01-26 RX ORDER — AMPICILLIN TRIHYDRATE 250 MG
1 CAPSULE ORAL EVERY 4 HOURS
Refills: 0 | Status: DISCONTINUED | OUTPATIENT
Start: 2024-01-26 | End: 2024-01-26

## 2024-01-26 RX ORDER — CITRIC ACID/SODIUM CITRATE 300-500 MG
15 SOLUTION, ORAL ORAL EVERY 6 HOURS
Refills: 0 | Status: DISCONTINUED | OUTPATIENT
Start: 2024-01-26 | End: 2024-01-26

## 2024-01-26 RX ORDER — SODIUM CHLORIDE 9 MG/ML
1000 INJECTION, SOLUTION INTRAVENOUS
Refills: 0 | Status: DISCONTINUED | OUTPATIENT
Start: 2024-01-26 | End: 2024-01-26

## 2024-01-26 RX ORDER — PRAMOXINE HYDROCHLORIDE 150 MG/15G
1 AEROSOL, FOAM RECTAL EVERY 4 HOURS
Refills: 0 | Status: DISCONTINUED | OUTPATIENT
Start: 2024-01-26 | End: 2024-01-28

## 2024-01-26 RX ORDER — HYDROCORTISONE 1 %
1 OINTMENT (GRAM) TOPICAL EVERY 6 HOURS
Refills: 0 | Status: DISCONTINUED | OUTPATIENT
Start: 2024-01-26 | End: 2024-01-28

## 2024-01-26 RX ORDER — TETANUS TOXOID, REDUCED DIPHTHERIA TOXOID AND ACELLULAR PERTUSSIS VACCINE, ADSORBED 5; 2.5; 8; 8; 2.5 [IU]/.5ML; [IU]/.5ML; UG/.5ML; UG/.5ML; UG/.5ML
0.5 SUSPENSION INTRAMUSCULAR ONCE
Refills: 0 | Status: DISCONTINUED | OUTPATIENT
Start: 2024-01-26 | End: 2024-01-28

## 2024-01-26 RX ORDER — SODIUM CHLORIDE 9 MG/ML
1000 INJECTION, SOLUTION INTRAVENOUS ONCE
Refills: 0 | Status: COMPLETED | OUTPATIENT
Start: 2024-01-26 | End: 2024-01-26

## 2024-01-26 RX ORDER — OXYCODONE HYDROCHLORIDE 5 MG/1
5 TABLET ORAL ONCE
Refills: 0 | Status: DISCONTINUED | OUTPATIENT
Start: 2024-01-26 | End: 2024-01-28

## 2024-01-26 RX ORDER — SIMETHICONE 80 MG/1
80 TABLET, CHEWABLE ORAL EVERY 4 HOURS
Refills: 0 | Status: DISCONTINUED | OUTPATIENT
Start: 2024-01-26 | End: 2024-01-28

## 2024-01-26 RX ORDER — ACETAMINOPHEN 500 MG
975 TABLET ORAL
Refills: 0 | Status: DISCONTINUED | OUTPATIENT
Start: 2024-01-26 | End: 2024-01-28

## 2024-01-26 RX ORDER — LANOLIN
1 OINTMENT (GRAM) TOPICAL EVERY 6 HOURS
Refills: 0 | Status: DISCONTINUED | OUTPATIENT
Start: 2024-01-26 | End: 2024-01-28

## 2024-01-26 RX ORDER — SODIUM CHLORIDE 9 MG/ML
3 INJECTION INTRAMUSCULAR; INTRAVENOUS; SUBCUTANEOUS EVERY 8 HOURS
Refills: 0 | Status: DISCONTINUED | OUTPATIENT
Start: 2024-01-26 | End: 2024-01-28

## 2024-01-26 RX ORDER — IBUPROFEN 200 MG
600 TABLET ORAL EVERY 6 HOURS
Refills: 0 | Status: COMPLETED | OUTPATIENT
Start: 2024-01-26 | End: 2024-12-24

## 2024-01-26 RX ORDER — SODIUM CHLORIDE 9 MG/ML
300 INJECTION INTRAMUSCULAR; INTRAVENOUS; SUBCUTANEOUS ONCE
Refills: 0 | Status: COMPLETED | OUTPATIENT
Start: 2024-01-26 | End: 2024-01-26

## 2024-01-26 RX ORDER — BENZOCAINE 10 %
1 GEL (GRAM) MUCOUS MEMBRANE EVERY 6 HOURS
Refills: 0 | Status: DISCONTINUED | OUTPATIENT
Start: 2024-01-26 | End: 2024-01-28

## 2024-01-26 RX ORDER — DIPHENHYDRAMINE HCL 50 MG
25 CAPSULE ORAL EVERY 6 HOURS
Refills: 0 | Status: DISCONTINUED | OUTPATIENT
Start: 2024-01-26 | End: 2024-01-28

## 2024-01-26 RX ORDER — SODIUM CHLORIDE 9 MG/ML
1000 INJECTION INTRAMUSCULAR; INTRAVENOUS; SUBCUTANEOUS
Refills: 0 | Status: DISCONTINUED | OUTPATIENT
Start: 2024-01-26 | End: 2024-01-28

## 2024-01-26 RX ORDER — DIBUCAINE 1 %
1 OINTMENT (GRAM) RECTAL EVERY 6 HOURS
Refills: 0 | Status: DISCONTINUED | OUTPATIENT
Start: 2024-01-26 | End: 2024-01-28

## 2024-01-26 RX ORDER — KETOROLAC TROMETHAMINE 30 MG/ML
30 SYRINGE (ML) INJECTION ONCE
Refills: 0 | Status: DISCONTINUED | OUTPATIENT
Start: 2024-01-26 | End: 2024-01-26

## 2024-01-26 RX ORDER — AMPICILLIN TRIHYDRATE 250 MG
2 CAPSULE ORAL ONCE
Refills: 0 | Status: COMPLETED | OUTPATIENT
Start: 2024-01-26 | End: 2024-01-26

## 2024-01-26 RX ADMIN — Medication 0.25 MILLIGRAM(S): at 15:38

## 2024-01-26 RX ADMIN — SODIUM CHLORIDE 300 MILLILITER(S): 9 INJECTION INTRAMUSCULAR; INTRAVENOUS; SUBCUTANEOUS at 20:19

## 2024-01-26 RX ADMIN — Medication 30 MILLIGRAM(S): at 23:42

## 2024-01-26 RX ADMIN — SODIUM CHLORIDE 125 MILLILITER(S): 9 INJECTION, SOLUTION INTRAVENOUS at 12:31

## 2024-01-26 RX ADMIN — SODIUM CHLORIDE 1000 MILLILITER(S): 9 INJECTION, SOLUTION INTRAVENOUS at 19:49

## 2024-01-26 RX ADMIN — Medication 0.25 MILLIGRAM(S): at 15:35

## 2024-01-26 RX ADMIN — Medication 30 MILLIGRAM(S): at 23:10

## 2024-01-26 RX ADMIN — Medication 108 GRAM(S): at 17:00

## 2024-01-26 RX ADMIN — Medication 4 MILLIUNIT(S)/MIN: at 16:21

## 2024-01-26 RX ADMIN — Medication 4 MILLIUNIT(S)/MIN: at 15:25

## 2024-01-26 RX ADMIN — Medication 108 GRAM(S): at 21:01

## 2024-01-26 RX ADMIN — Medication 200 GRAM(S): at 12:31

## 2024-01-26 RX ADMIN — Medication 1000 MILLIUNIT(S)/MIN: at 22:19

## 2024-01-26 RX ADMIN — SODIUM CHLORIDE 125 MILLILITER(S): 9 INJECTION INTRAMUSCULAR; INTRAVENOUS; SUBCUTANEOUS at 20:34

## 2024-01-26 NOTE — OB RN DELIVERY SUMMARY - NSSELHIDDEN_OBGYN_ALL_OB_FT
[NS_DeliveryAttending1_OBGYN_ALL_OB_FT:[id number]=],[NS_DeliveryAssist1_OBGYN_ALL_OB_FT:[id number]=],[NS_DeliveryRN_OBGYN_ALL_OB_FT:[id number]=]

## 2024-01-26 NOTE — OB PROVIDER DELIVERY SUMMARY - NSSELHIDDEN_OBGYN_ALL_OB_FT
[NS_DeliveryAttending1_OBGYN_ALL_OB_FT:[id number]=],[NS_DeliveryAssist1_OBGYN_ALL_OB_FT:[id number]=]

## 2024-01-26 NOTE — OB PROVIDER LABOR PROGRESS NOTE - NS_SUBJECTIVE/OBJECTIVE_OBGYN_ALL_OB_FT
NP Chart Note:    Called to the bedside by the RN for a prolonged deceleration down to 80 bpm for 6 mins.  Likely secondary to a tetonic contraction after pitocin initiation. The patient was examined and found to be 3/70/-3 ruptured clear fluid.  IV bolus, pitocin discontinued, ISE, terbutaline .25mg IV x 2 with good recovery in FHR.
R1 OB Labor Note    S: Patient seen and examined at bedside. IUPC placed.    T(C): 37.1 (01-26-24 @ 19:26), Max: 37.2 (01-26-24 @ 17:38)  HR: 71 (01-26-24 @ 19:53) (63 - 119)  BP: 118/71 (01-26-24 @ 19:53) (79/44 - 140/75)  RR: 16 (01-26-24 @ 19:26) (16 - 18)  SpO2: 100% (01-26-24 @ 19:51) (92% - 100%)
R1 OB Labor Note    S: Patient seen and examined at bedside. IUPC replaced and amnioinfusion started    T(C): 37.1 (01-26-24 @ 19:26), Max: 37.2 (01-26-24 @ 17:38)  HR: 80 (01-26-24 @ 20:24) (63 - 119)  BP: 117/55 (01-26-24 @ 20:24) (79/44 - 140/75)  RR: 16 (01-26-24 @ 19:26) (16 - 18)  SpO2: 100% (01-26-24 @ 20:21) (92% - 100%)
pt comf  feeling some pressure

## 2024-01-26 NOTE — OB PROVIDER LABOR PROGRESS NOTE - NS_OBIHIFHRDETAILS_OBGYN_ALL_OB_FT
145/mod/no accels, intermittent early and late decelerations
135 moderate variability, + acels, + prolonged deceleration
125/mod/variables with contractions
140'S + GOOD VARIABILITY, MILD VARIABLES

## 2024-01-26 NOTE — OB PROVIDER DELIVERY SUMMARY - NSPROVIDERDELIVERYNOTE_OBGYN_ALL_OB_FT
The patient became fully dilated with urge to push.  of a viable male infant. Head, shoulders and body delivered easily in OA position. Nuchal x1, delivered through. There was delayed cord clamping of 1min. Cord gases obtained. The placenta delivered spontaneously, intact, with a three vessel cord. Pitocin was administered. The uterus, cervix, vagina and perineum were palpated and inspected, no retained products were noted. Induration noted through the left labia, does not extend into the vagina or perineum. Bimanual exam performed, with minimal clot evacuated. Fundus and lower uterine segment firm. Second degree laceration of the perineum noted. The laceration was repaired with vicryl rapide in the usual manner with restoration of anatomy and excellent hemostasis.    Present for delivery were Dr. Larios, Dr. Mike Mckeon PGY-1 The patient became fully dilated with urge to push.  of a viable male infant. Head, shoulders and body delivered easily in OA position. Nuchal x1, delivered through. There was delayed cord clamping of 1min. Cord gases obtained. The placenta delivered spontaneously, intact, with a three vessel cord. Pitocin was administered. The uterus, cervix, vagina and perineum were palpated and inspected, no retained products were noted. Induration noted through the left labia, does not extend into the vagina or perineum. Bimanual exam performed, with minimal clot evacuated. Fundus and lower uterine segment firm. Second degree laceration of the perineum noted. The laceration was repaired with vicryl rapide in the usual manner with restoration of anatomy and excellent hemostasis.    Present for delivery were Dr. Mike Robertson-    Sheela Larios M.D.

## 2024-01-26 NOTE — OB RN PATIENT PROFILE - PRO FEEDING PLAN INFANT OB
Render Risk Assessment In Note?: no
Detail Level: Simple
Additional Notes: Patient was recommended to see our [address] location with Dr. Lopes to discuss IPL laser
initiation of breastfeeding/breast milk feeding

## 2024-01-26 NOTE — OB PROVIDER H&P - NSLOWPPHRISK_OBGYN_A_OB
No previous uterine incision/Alas Pregnancy/Less than or equal to 4 previous vaginal births/No known bleeding disorder/No history of postpartum hemorrhage/No other PPH risks indicated

## 2024-01-26 NOTE — OB PROVIDER H&P - HISTORY OF PRESENT ILLNESS
History and Physical    The patient is a 34 y/o  EDC 2024 @ 39.2 weeks presenting to L&D from the office with elevated BPs of 139/92-> repeated 142/90.  Given the elevated BPs in the office today, decision was made for an elective IOL.  The patient reports a mild HA, denies blurry vision, epigastric pain.  Additionally, the patient denies LOF, VB, contx. endorses good fetal movement. The patient accepts all blood products    allergies: nkda  meds: PNV    PNC: marginal cord insertion    GBS: (2024): positive  EFW: (2024): 3000g    Medhx: pt denies cardiac, pulm, heme, GI, neuro  OBhx: current  Sxhx: () tooth extraction  Gynhx: pt denies cysts, fibroids, abn. pap, STDs  Sochx: pt denies  Famhx: pt denies

## 2024-01-26 NOTE — OB RN DELIVERY SUMMARY - NS_SEPSISRSKCALC_OBGYN_ALL_OB_FT
EOS calculated successfully. EOS Risk Factor: 0.5/1000 live births ([facility] national incidence); GA=39w2d; Temp=99.7; ROM=7.183; GBS='Positive'; Antibiotics='GBS specific antibiotics > 2 hrs prior to birth'

## 2024-01-26 NOTE — OB RN DELIVERY SUMMARY - NS_FETALMONITOR_OBGYN_ALL_OB
External [place]/External FHR External [employer]/Internal [employer]/External FHR/Internal FHR

## 2024-01-26 NOTE — OB PROVIDER H&P - NSHPREVIEWOFSYSTEMS_GEN_ALL_CORE
ICU Vital Signs Last 24 Hrs  T(C): 36.8 (26 Jan 2024 11:30), Max: 36.8 (26 Jan 2024 11:27)  T(F): 98.2 (26 Jan 2024 11:30), Max: 98.24 (26 Jan 2024 11:27)  HR: 74 (26 Jan 2024 12:10) (70 - 83)  BP: 120/88 (26 Jan 2024 12:06) (110/76 - 132/77)  BP(mean): --  ABP: --  ABP(mean): --  RR: 18 (26 Jan 2024 11:30) (18 - 18)  SpO2: 99% (26 Jan 2024 12:10) (98% - 100%)    O2 Parameters below as of 26 Jan 2024 11:30  Patient On (Oxygen Delivery Method): room air      gen: A&Ox3  CV: rrr s1s2  abd: gravid soft  VE: 3/80/-3 intact  EFM: 125 moderate variability, + acels, -decels, reactive   toco: none

## 2024-01-26 NOTE — OB PROVIDER LABOR PROGRESS NOTE - ASSESSMENT
A/P:   - Labor:  eIOL. Currently ruptured on pitocin. Patient repositioned and given fluid bolus, IUPC placed. Will continue to resuscitate tracing and can turn down pitocin if persistently cat 2  - Fetus: cat 2  - GBS: negative  - Pain: epidural    Daja Mckeon, PGY-1  d/w Dr. Larios
1. PIT  2. EPI  3. AWAIT MORE PRESSURE TO PUSH  
A/P:   - Labor:  eIOL. Pitocin stopped, IUPC replaced, and amnioinfusion started to resuscitate tracing. FHT improving with amnioinfusion bolus and repositioning  - Fetus: cat 2  - GBS: negative  - Pain: epidural    Daja Panella, PGY-1  Dr. Larios aware and en route to the hospital
34 Y/O  @ 39.2 weeks admitted for an elective IOL  -IOL: pitocin discontinued, will re-start in 20 mins. at 2 milliunits/min    d/w Dr. Ric Patel NP

## 2024-01-26 NOTE — OB RN PATIENT PROFILE - NSSDOHFOODOUT_OBGYN_A_OB
Physical Therapy  Facility/Department: [address]  Physical Therapy Initial Assessment and Treatment    Name: Lesa Jaimes  : 1967  MRN: [id number]  Date of Service: 2023    Discharge Recommendations:    Lesa Jaimes scored a 19/24 on the AM-PAC short mobility form. Current research shows that an AM-PAC score of 18 or greater is typically associated with a discharge to the patient's home setting. Based on the patient's AM-PAC score and their current functional mobility deficits, it is recommended that the patient have 2-3 sessions per week of Physical Therapy at d/c to increase the patient's independence. At this time, this patient demonstrates the endurance and safety to discharge home with    (home vs OP services) and a follow up treatment frequency of 2-3x/wk. Please see assessment section for further patient specific details. If patient discharges prior to next session this note will serve as a discharge summary. Please see below for the latest assessment towards goals. PT Equipment Recommendations  Equipment Needed: Yes (RW for return home.)      Patient Diagnosis(es): The encounter diagnosis was Malignant neoplasm of right lung, unspecified part of lung ([facility] [address] Past Medical History:  has a past medical history of Hypertension. Past Surgical History:  has no past surgical history on file. Assessment   Assessment: Pt normally lives alone and is independent with functional mobility, ADL and gait. He lives in a third floor apartment and is currently unable to work. Currently pt ambulates safely with RW which he does not normally use. Recommend continued therapies to maximize mobility, safety and independence  Treatment Diagnosis: Decreased functional mobility post admission for LE swelling and new lung mass.   Barriers to Learning: none noted  Activity Tolerance  Activity Tolerance: Patient tolerated treatment well;Patient tolerated evaluation without incident Plan   Physcial Therapy Plan  General Plan:  (2-5)  Current Treatment Recommendations: Strengthening, ROM, Functional mobility training, Transfer training, Gait training, Stair training, Safety education & training, Endurance training  Safety Devices  Type of Devices: Nurse notified, Call light within reach, Left in chair (Pt is normally not using alarm so that he can stand to use urinal.  Pt and RN aware of no alarms.)     Restrictions  Position Activity Restriction  Other position/activity restrictions: up with assist     Subjective   General  Additional Pertinent Hx: Pt adm 2/17 with jayesh LE swelling and inability to ambulate. Chest CT  Large right upper lobe and right hilar mass consistent with primary lung cancer. 2.  Mild subcarinal and pretracheal lymphadenopathy may represent metastatic disease. 3.  Severe emphysema  Referring Practitioner: Verna Ferguson  Diagnosis: RUL mass, LE swelling  Subjective  Subjective: Pt in bed upon PT entry, agreeable to working with PT.  notes legs are painful, not rated         Social/Functional History  Social/Functional History  Lives With: Alone  Type of Home: Apartment  Home Layout: Laundry in basement  Home Access: Stairs to enter with rails  Entrance Stairs - Number of Steps: three flights to apartment  Bathroom Shower/Tub: Tub/Shower unit  Bathroom Toilet: Standard (sink nearby)  [employer]: None  Bathroom Accessibility: Accessible  Has the patient had two or more falls in the past year or any fall with injury in the past year?: No  Receives Help From: Family (nephew)  ADL Assistance: Independent  Homemaking Assistance: Independent  Ambulation Assistance: Independent  Transfer Assistance: Independent  Active : No  Patient's  Info: nephew does errands erc  Occupation: Unemployed  Type of Occupation: was working in a [address] and was let go recently 2/2 inability to perform job per pt.   Vision/Hearing  Vision  Vision: Impaired  Vision Exceptions: Wears glasses at all times  Hearing  Hearing: Within functional limits    Cognition   Orientation  Overall Orientation Status: Within Normal Limits  Cognition  Overall Cognitive Status: WNL     Objective   Heart Rate: 92  Heart Rate Source: Telemetry  BP: 119/84  BP Location: Left upper arm  BP Method: Automatic  Patient Position: Lying right side  MAP (Calculated): 96  Resp: 18  SpO2: 100 %  O2 Device: None (Room air)              AROM RLE (degrees)  RLE AROM: WFL  AROM LLE (degrees)  LLE AROM : WFL  Strength RLE  Strength RLE: WFL  Strength LLE  Strength LLE: WFL           Bed mobility  Supine to Sit: Modified independent  Sit to Supine: Modified independent  Bed Mobility Comments: Pt has permission from RN to stand at side of bed to use urinal.  Transfers  Sit to Stand: Stand by assistance (from eob, toilet and chair)  Stand to Sit: Stand by assistance (to toilet and chair)  Ambulation  Surface: Level tile  Device: No Device  Assistance: Contact guard assistance  Quality of Gait: without AD, pt demonstrated unsteady gait, reaching for walls and furniture. with RW pt demonstrated good stability with SBA  Gait Deviations: Slow Lyndsay; Increased CLAUDIO  Distance: 10 ft. Comments: with RW pt demonstrated good stability and ambulated 20 ft.      Balance  Comments: good stability with RW, unsteady without AD     AM-PAC Score  AM-PAC Inpatient Mobility Raw Score : 19 (02/20/23 1142)  AM-PAC Inpatient T-Scale Score : 45.44 (02/20/23 1142)  Mobility Inpatient CMS 0-100% Score: 41.77 (02/20/23 1142)  Mobility Inpatient CMS G-Code Modifier : CK (02/20/23 1142)     Goals  Short Term Goals  Time Frame for Short Term Goals: Discharge  Short Term Goal 1: indep bed mobility  Short Term Goal 2: indep transfers  Short Term Goal 3: indep gait with RW x 200 ft  Short Term Goal 4: up and down flight of steps with rail and CG  Patient Goals   Patient Goals : to return home and get assist from SW       Education role of PT, safety, transfers, gait.         Therapy Time   Individual Concurrent Group Co-treatment   Time In 0820         Time Out 0900         Minutes 40             Timed Code Treatment Minutes:   25    Total Treatment Minutes:  [address], [id number] never true

## 2024-01-26 NOTE — OB RN PATIENT PROFILE - FALL HARM RISK - UNIVERSAL INTERVENTIONS
Bed in lowest position, wheels locked, appropriate side rails in place/Call bell, personal items and telephone in reach/Instruct patient to call for assistance before getting out of bed or chair/Non-slip footwear when patient is out of bed/[place] to call system/Physically safe environment - no spills, clutter or unnecessary equipment/Purposeful Proactive Rounding/Room/bathroom lighting operational, light cord in reach

## 2024-01-26 NOTE — OB RN TRIAGE NOTE - FALL HARM RISK - UNIVERSAL INTERVENTIONS
Bed in lowest position, wheels locked, appropriate side rails in place/Call bell, personal items and telephone in reach/Instruct patient to call for assistance before getting out of bed or chair/Non-slip footwear when patient is out of bed/[place] to call system/Physically safe environment - no spills, clutter or unnecessary equipment/Purposeful Proactive Rounding/Room/bathroom lighting operational, light cord in reach

## 2024-01-26 NOTE — OB RN DELIVERY SUMMARY - NS_LABORCHARACTER_OBGYN_ALL_OB
Induction of labor-AROM/Induction of labor-Medicinal/External electronic FM/Antibiotics in labor Induction of labor-AROM/Induction of labor-Medicinal/Internal electronic FM/External electronic FM/Antibiotics in labor

## 2024-01-26 NOTE — PRE-ANESTHESIA EVALUATION ADULT - BMI (KG/M2)
RECORDS STATUS - ALL OTHER DIAGNOSIS      RECORDS RECEIVED FROM: HCC (hepatocellular carcinoma), per wife Raven. Referred by Yady Hilton   DATE RECEIVED: 6.22.21   NOTES STATUS DETAILS   OFFICE NOTE from referring provider Internal 6.8.21 ASTRID Swenson Rad   OFFICE NOTE from medical oncologist Internal 4.19.21 ASTRID Calabrese Gastro  7.7.20 ASTRID Swenson Radiology   DISCHARGE SUMMARY from hospital Internal and [facility]    DISCHARGE REPORT from the ER     OPERATIVE REPORT     MEDICATION LIST Internal    CLINICAL TRIAL TREATMENTS TO DATE N/A    LABS     PATHOLOGY REPORTS     ANYTHING RELATED TO DIAGNOSIS Internal and Care Everywhere Most recent: 6.1.21   GENONOMIC TESTING     TYPE: N/A    IMAGING (NEED IMAGES & REPORT)     CT SCANS Internal 1.7.21 abdomen  1.5.21 NM bone scan   MRI Internal 6.1.21 abdomen    XRAY Internal 6.3.21 chest   ULTRASOUND Internal 4.20.21 abdomen/pelvis   PET        29.4

## 2024-01-26 NOTE — OB PROVIDER H&P - ASSESSMENT
34 y/o  @ 39.2 weeks admitted for an elective IOL with labile BPs  -admit to L&D  -routine labs  -NPO bicitra  -HELLP labs  -IV hydration  -GBS positive, Initally Ampicillin 2g -> Q4hrs 1g  -anesthesia consult  -anticipated     d/w Dr. Ric Patel NP

## 2024-01-27 ENCOUNTER — TRANSCRIPTION ENCOUNTER (OUTPATIENT)
Age: 34
End: 2024-01-27

## 2024-01-27 LAB — T PALLIDUM AB TITR SER: NEGATIVE — SIGNIFICANT CHANGE UP

## 2024-01-27 RX ORDER — POLYETHYLENE GLYCOL 3350 17 G/17G
17 POWDER, FOR SOLUTION ORAL EVERY 24 HOURS
Refills: 0 | Status: DISCONTINUED | OUTPATIENT
Start: 2024-01-27 | End: 2024-01-28

## 2024-01-27 RX ORDER — SENNA PLUS 8.6 MG/1
2 TABLET ORAL AT BEDTIME
Refills: 0 | Status: DISCONTINUED | OUTPATIENT
Start: 2024-01-27 | End: 2024-01-28

## 2024-01-27 RX ORDER — ACETAMINOPHEN 500 MG
3 TABLET ORAL
Qty: 0 | Refills: 0 | DISCHARGE
Start: 2024-01-27

## 2024-01-27 RX ORDER — IBUPROFEN 200 MG
600 TABLET ORAL EVERY 6 HOURS
Refills: 0 | Status: DISCONTINUED | OUTPATIENT
Start: 2024-01-27 | End: 2024-01-28

## 2024-01-27 RX ORDER — IBUPROFEN 200 MG
1 TABLET ORAL
Qty: 0 | Refills: 0 | DISCHARGE
Start: 2024-01-27

## 2024-01-27 RX ADMIN — Medication 975 MILLIGRAM(S): at 20:43

## 2024-01-27 RX ADMIN — Medication 600 MILLIGRAM(S): at 06:35

## 2024-01-27 RX ADMIN — Medication 600 MILLIGRAM(S): at 12:30

## 2024-01-27 RX ADMIN — Medication 975 MILLIGRAM(S): at 09:56

## 2024-01-27 RX ADMIN — Medication 600 MILLIGRAM(S): at 18:16

## 2024-01-27 RX ADMIN — Medication 975 MILLIGRAM(S): at 08:59

## 2024-01-27 RX ADMIN — Medication 975 MILLIGRAM(S): at 15:45

## 2024-01-27 RX ADMIN — Medication 600 MILLIGRAM(S): at 17:34

## 2024-01-27 RX ADMIN — POLYETHYLENE GLYCOL 3350 17 GRAM(S): 17 POWDER, FOR SOLUTION ORAL at 11:42

## 2024-01-27 RX ADMIN — SENNA PLUS 2 TABLET(S): 8.6 TABLET ORAL at 20:43

## 2024-01-27 RX ADMIN — Medication 1 TABLET(S): at 11:33

## 2024-01-27 RX ADMIN — Medication 600 MILLIGRAM(S): at 11:33

## 2024-01-27 RX ADMIN — Medication 975 MILLIGRAM(S): at 02:35

## 2024-01-27 RX ADMIN — Medication 975 MILLIGRAM(S): at 14:47

## 2024-01-27 RX ADMIN — Medication 975 MILLIGRAM(S): at 02:05

## 2024-01-27 RX ADMIN — Medication 975 MILLIGRAM(S): at 21:20

## 2024-01-27 RX ADMIN — Medication 600 MILLIGRAM(S): at 07:55

## 2024-01-27 RX ADMIN — SODIUM CHLORIDE 3 MILLILITER(S): 9 INJECTION INTRAMUSCULAR; INTRAVENOUS; SUBCUTANEOUS at 06:29

## 2024-01-27 NOTE — DISCHARGE NOTE OB - HOSPITAL COURSE
Patient had an uncomplicated  followed by an uncomplicated postpartum course. On postpartum day 2, patient was discharged home in stable condition, voiding spontaneously and with normal vital signs.

## 2024-01-27 NOTE — DISCHARGE NOTE OB - PATIENT PORTAL LINK FT
You can access the FollowMyHealth Patient Portal offered by [facility] by registering at the following website: [email or web address]. By joining [email or web address]’s FollowMyHealth portal, you will also be able to view your health information using other applications (apps) compatible with our system.

## 2024-01-27 NOTE — DISCHARGE NOTE OB - MEDICATION SUMMARY - MEDICATIONS TO TAKE
I will START or STAY ON the medications listed below when I get home from the hospital:    ibuprofen 600 mg oral tablet  -- 1 tab(s) by mouth every 6 hours  -- Indication: For Moderate pain    acetaminophen 325 mg oral tablet  -- 3 tab(s) by mouth every 6 hours  -- Indication: For Mild pain    Prenatal Multivitamins oral tablet  -- 1 tab(s) by mouth once a day  -- Indication: For Home medication

## 2024-01-27 NOTE — PROGRESS NOTE ADULT - ASSESSMENT
34y/o  PPD#1 from  c/b L labial hematoma. Overall, patient stable and recovering well postpartum.    #L labial hematoma  - stable from delivery    #Postpartum state  - Continue with po analgesia  - Increase ambulation  - Continue regular diet  - IV lock  - No labs    Daja Mckeon  PGY-1

## 2024-01-27 NOTE — PROGRESS NOTE ADULT - ATTENDING COMMENTS
P1 PPD#1 s/p  c/b L labial hematoma. Hematoma stable.   -labile BPs intrapartum, but now normotensive. no s/s of gHTN/PEC  -Fundus firm, midline  -lochia WNL  -pain well controlled  -c/w routine pp care    ANKIT Meyer MD

## 2024-01-27 NOTE — DISCHARGE NOTE OB - NS MD DC FALL RISK RISK
For information on Fall & Injury Prevention, visit: [email or web address] OR  [email or web address] OR  https://www.cdc.gov/steadi/patient.html

## 2024-01-27 NOTE — DISCHARGE NOTE OB - CARE PLAN
Principal Discharge DX:	 (normal spontaneous vaginal delivery)  Assessment and plan of treatment:	1) Make a follow up in 6 weeks for a postpartum visit.  2) No heavy lifting, driving, or strenuous activity for 6 weeks.   3) Nothing in the vagina such as tampons, intercourse, douches, or tub baths for 6 weeks or until you see your doctor.   4) Call your doctor with any signs and symptoms of infection such as fever, chills, nausea, or vomiting.   5) Call your doctor if you're unable to tolerate food, have an increase in vaginal bleeding, or have difficulty urinating.   6) Call your doctor if you have pain that is not relieved by over the counter medications.     Notify your doctor with any other concerns.   1

## 2024-01-27 NOTE — DISCHARGE NOTE OB - CARE PROVIDER_API CALL
Sheela Larios  Obstetrics and Gynecology  [address]  [place] [address]  Phone: [phone]  Fax: [phone]  Follow Up Time:

## 2024-01-28 VITALS
SYSTOLIC BLOOD PRESSURE: 125 MMHG | HEART RATE: 66 BPM | OXYGEN SATURATION: 97 % | RESPIRATION RATE: 18 BRPM | DIASTOLIC BLOOD PRESSURE: 82 MMHG | TEMPERATURE: 98 F

## 2024-01-28 PROCEDURE — 82570 ASSAY OF URINE CREATININE: CPT

## 2024-01-28 PROCEDURE — 85025 COMPLETE CBC W/AUTO DIFF WBC: CPT

## 2024-01-28 PROCEDURE — 86780 TREPONEMA PALLIDUM: CPT

## 2024-01-28 PROCEDURE — 85730 THROMBOPLASTIN TIME PARTIAL: CPT

## 2024-01-28 PROCEDURE — 59050 FETAL MONITOR W/REPORT: CPT

## 2024-01-28 PROCEDURE — 85384 FIBRINOGEN ACTIVITY: CPT

## 2024-01-28 PROCEDURE — 84156 ASSAY OF PROTEIN URINE: CPT

## 2024-01-28 PROCEDURE — 86850 RBC ANTIBODY SCREEN: CPT

## 2024-01-28 PROCEDURE — 84550 ASSAY OF BLOOD/URIC ACID: CPT

## 2024-01-28 PROCEDURE — 83615 LACTATE (LD) (LDH) ENZYME: CPT

## 2024-01-28 PROCEDURE — 85610 PROTHROMBIN TIME: CPT

## 2024-01-28 PROCEDURE — 80053 COMPREHEN METABOLIC PANEL: CPT

## 2024-01-28 PROCEDURE — 81001 URINALYSIS AUTO W/SCOPE: CPT

## 2024-01-28 PROCEDURE — 86900 BLOOD TYPING SEROLOGIC ABO: CPT

## 2024-01-28 PROCEDURE — 86901 BLOOD TYPING SEROLOGIC RH(D): CPT

## 2024-01-28 RX ADMIN — Medication 975 MILLIGRAM(S): at 16:11

## 2024-01-28 RX ADMIN — Medication 975 MILLIGRAM(S): at 03:30

## 2024-01-28 RX ADMIN — Medication 600 MILLIGRAM(S): at 06:26

## 2024-01-28 RX ADMIN — POLYETHYLENE GLYCOL 3350 17 GRAM(S): 17 POWDER, FOR SOLUTION ORAL at 12:38

## 2024-01-28 RX ADMIN — Medication 975 MILLIGRAM(S): at 15:41

## 2024-01-28 RX ADMIN — Medication 600 MILLIGRAM(S): at 13:05

## 2024-01-28 RX ADMIN — Medication 975 MILLIGRAM(S): at 09:00

## 2024-01-28 RX ADMIN — Medication 600 MILLIGRAM(S): at 05:38

## 2024-01-28 RX ADMIN — Medication 975 MILLIGRAM(S): at 09:30

## 2024-01-28 RX ADMIN — Medication 975 MILLIGRAM(S): at 02:55

## 2024-01-28 RX ADMIN — Medication 600 MILLIGRAM(S): at 12:35

## 2024-01-28 RX ADMIN — Medication 600 MILLIGRAM(S): at 00:40

## 2024-01-28 RX ADMIN — Medication 600 MILLIGRAM(S): at 00:04

## 2024-01-28 RX ADMIN — Medication 1 TABLET(S): at 12:34

## 2024-01-28 NOTE — PROGRESS NOTE ADULT - ASSESSMENT
32y/o  PPD#2 from  c/b L labial hematoma. Overall, patient stable and recovering well postpartum.    #L labial hematoma  - stable from delivery    #Postpartum state  - Continue with po analgesia  - Increase ambulation  - Continue regular diet  - IV lock  - No labs    Deepthi Moreno MD PGY1 34y/o  PPD#2 from  c/b L labial hematoma. Overall, patient stable and recovering well postpartum.    #L labial hematoma  - stable from delivery    #Postpartum state  - Continue with po analgesia  - Increase ambulation  - Continue regular diet  - IV lock  - No labs    Deepthi Moreno MD PGY1    Sheela Larios M.D.

## 2024-01-28 NOTE — PROGRESS NOTE ADULT - SUBJECTIVE AND OBJECTIVE BOX
OB Progress Note:  PPD#2    S: 32yo PPD#2 s/p  c/b labial hematoma. Patient feels well. Reports in improvement in vaginal swelling and pain.  Pain is well controlled, tolerating regular diet, passing flatus, voiding spontaneously, ambulating without difficulty. Denies heavy vaginal bleeding, CP/SOB, leadheadedness/dizziness, N/V.    O:  Vitals:   Vital Signs Last 24 Hrs  T(C): 36.7 (2024 05:10), Max: 36.7 (2024 21:37)  T(F): 98 (2024 05:10), Max: 98.1 (2024 21:37)  HR: 66 (2024 05:10) (66 - 80)  BP: 125/82 (2024 05:10) (118/79 - 136/88)  BP(mean): --  RR: 18 (2024 05:10) (18 - 20)  SpO2: 97% (2024 05:10) (96% - 100%)    Parameters below as of 2024 05:10  Patient On (Oxygen Delivery Method): room air        MEDICATIONS  (STANDING):  acetaminophen     Tablet .. 975 milliGRAM(s) Oral <User Schedule>  diphtheria/tetanus/pertussis (acellular) Vaccine (Adacel) 0.5 milliLiter(s) IntraMuscular once  ibuprofen  Tablet. 600 milliGRAM(s) Oral every 6 hours  oxytocin Infusion 333.333 milliUNIT(s)/Min (1000 mL/Hr) IV Continuous <Continuous>  polyethylene glycol 3350 17 Gram(s) Oral every 24 hours  prenatal multivitamin 1 Tablet(s) Oral daily  senna 2 Tablet(s) Oral at bedtime  sodium chloride 0.9% lock flush 3 milliLiter(s) IV Push every 8 hours  sodium chloride 0.9%. 1000 milliLiter(s) (125 mL/Hr) IV Continuous <Continuous>    MEDICATIONS  (PRN):  benzocaine 20%/menthol 0.5% Spray 1 Spray(s) Topical every 6 hours PRN for Perineal discomfort  dibucaine 1% Ointment 1 Application(s) Topical every 6 hours PRN Perineal discomfort  diphenhydrAMINE 25 milliGRAM(s) Oral every 6 hours PRN Pruritus  hydrocortisone 1% Cream 1 Application(s) Topical every 6 hours PRN Moderate Pain (4-6)  lanolin Ointment 1 Application(s) Topical every 6 hours PRN nipple soreness  magnesium hydroxide Suspension 30 milliLiter(s) Oral two times a day PRN Constipation  oxyCODONE    IR 5 milliGRAM(s) Oral once PRN Moderate to Severe Pain (4-10)  pramoxine 1%/zinc 5% Cream 1 Application(s) Topical every 4 hours PRN Moderate Pain (4-6)  simethicone 80 milliGRAM(s) Chew every 4 hours PRN Gas  witch hazel Pads 1 Application(s) Topical every 4 hours PRN Perineal discomfort      Labs:  Blood type: A Positive  Rubella IgG: RPR: Negative                          12.1   10.78<H> >-----------< 206    (  @ 12:47 )             36.1    24 @ 12:47      137  |  105  |  7   ----------------------------<  87  4.1   |  22  |  0.55        Ca    8.9      2024 12:47    TPro  6.3  /  Alb  3.6  /  TBili  0.2  /  DBili  x   /  AST  16  /  ALT  15  /  AlkPhos  161<H>  24 @ 12:47          Physical Exam:  General: NAD  Abdomen: Soft, non-tender, non-distended, fundus firm  : L labial induration with improvement, does not extend into vagina  Pelvic: Lochia wnl    
R1 Progress Note    Patient seen and examined at bedside, no acute overnight events. No acute complaints, pain well controlled. Patient is ambulating, voiding spontaneously, passing gas, and tolerating regular diet. Denies CP, SOB, N/V. Bleeding minimal.    Vital Signs Last 24 Hours  T(C): 36.7 (01-27-24 @ 01:25), Max: 37.6 (01-26-24 @ 22:47)  HR: 74 (01-27-24 @ 01:25) (63 - 134)  BP: 113/75 (01-27-24 @ 01:25) (79/44 - 140/75)  RR: 18 (01-27-24 @ 01:25) (16 - 18)  SpO2: 96% (01-27-24 @ 01:25) (89% - 100%)    Physical Exam:  General: NAD  Abdomen: Soft, non-tender, non-distended, fundus firm  : L labial induration, does not extend into vagina  Pelvic: Lochia wnl    Labs:    Blood Type: A Positive  Antibody Screen: Negative  RPR: Negative               12.1   10.78 )-----------( 206      ( 01-26 @ 12:47 )             36.1         MEDICATIONS  (STANDING):  acetaminophen     Tablet .. 975 milliGRAM(s) Oral <User Schedule>  diphtheria/tetanus/pertussis (acellular) Vaccine (Adacel) 0.5 milliLiter(s) IntraMuscular once  ibuprofen  Tablet. 600 milliGRAM(s) Oral every 6 hours  oxytocin Infusion 333.333 milliUNIT(s)/Min (1000 mL/Hr) IV Continuous <Continuous>  prenatal multivitamin 1 Tablet(s) Oral daily  sodium chloride 0.9% lock flush 3 milliLiter(s) IV Push every 8 hours  sodium chloride 0.9%. 1000 milliLiter(s) (125 mL/Hr) IV Continuous <Continuous>    MEDICATIONS  (PRN):  benzocaine 20%/menthol 0.5% Spray 1 Spray(s) Topical every 6 hours PRN for Perineal discomfort  dibucaine 1% Ointment 1 Application(s) Topical every 6 hours PRN Perineal discomfort  diphenhydrAMINE 25 milliGRAM(s) Oral every 6 hours PRN Pruritus  hydrocortisone 1% Cream 1 Application(s) Topical every 6 hours PRN Moderate Pain (4-6)  lanolin Ointment 1 Application(s) Topical every 6 hours PRN nipple soreness  magnesium hydroxide Suspension 30 milliLiter(s) Oral two times a day PRN Constipation  oxyCODONE    IR 5 milliGRAM(s) Oral once PRN Moderate to Severe Pain (4-10)  pramoxine 1%/zinc 5% Cream 1 Application(s) Topical every 4 hours PRN Moderate Pain (4-6)  simethicone 80 milliGRAM(s) Chew every 4 hours PRN Gas  witch hazel Pads 1 Application(s) Topical every 4 hours PRN Perineal discomfort

## 2024-01-30 ENCOUNTER — APPOINTMENT (OUTPATIENT)
Dept: OBGYN | Facility: CLINIC | Age: 34
End: 2024-01-30

## 2024-02-01 PROBLEM — G43.909 MIGRAINE, UNSPECIFIED, NOT INTRACTABLE, WITHOUT STATUS MIGRAINOSUS: Chronic | Status: ACTIVE | Noted: 2024-01-26

## 2024-03-05 ENCOUNTER — APPOINTMENT (OUTPATIENT)
Dept: OBGYN | Facility: CLINIC | Age: 34
End: 2024-03-05
Payer: COMMERCIAL

## 2024-03-05 VITALS
WEIGHT: 165 LBS | DIASTOLIC BLOOD PRESSURE: 74 MMHG | HEIGHT: 66 IN | SYSTOLIC BLOOD PRESSURE: 108 MMHG | BODY MASS INDEX: 26.52 KG/M2

## 2024-03-05 DIAGNOSIS — N89.8 OTHER SPECIFIED NONINFLAMMATORY DISORDERS OF VAGINA: ICD-10-CM

## 2024-03-05 PROCEDURE — 17250 CHEM CAUT OF GRANLTJ TISSUE: CPT

## 2024-03-05 PROCEDURE — 0503F POSTPARTUM CARE VISIT: CPT

## 2024-03-05 RX ORDER — ESTRADIOL 0.1 MG/G
0.1 CREAM VAGINAL
Qty: 42.5 | Refills: 0 | Status: ACTIVE | COMMUNITY
Start: 2024-03-05 | End: 1900-01-01

## 2024-03-06 LAB
C TRACH RRNA SPEC QL NAA+PROBE: NOT DETECTED
HPV HIGH+LOW RISK DNA PNL CVX: NOT DETECTED
N GONORRHOEA RRNA SPEC QL NAA+PROBE: NOT DETECTED
SOURCE AMPLIFICATION: NORMAL

## 2024-03-07 LAB — CYTOLOGY CVX/VAG DOC THIN PREP: NORMAL

## 2024-03-12 NOTE — DISCHARGE NOTE OB - TEMPERATURE GREATER THAN 100.0  F ORALLY
[facility] Care Coordination Contact    CHW jose for mbr to get updates on MA renewal. CHW provided call back #.    DAREN Arteaga  [facility]  [phone]         Statement Selected

## 2024-03-18 ENCOUNTER — APPOINTMENT (OUTPATIENT)
Dept: OBGYN | Facility: CLINIC | Age: 34
End: 2024-03-18
Payer: COMMERCIAL

## 2024-03-18 VITALS — DIASTOLIC BLOOD PRESSURE: 83 MMHG | SYSTOLIC BLOOD PRESSURE: 115 MMHG

## 2024-03-18 DIAGNOSIS — N76.4 ABSCESS OF VULVA: ICD-10-CM

## 2024-03-18 PROCEDURE — 99212 OFFICE O/P EST SF 10 MIN: CPT

## 2024-03-18 RX ORDER — CEPHALEXIN 500 MG/1
500 CAPSULE ORAL
Qty: 14 | Refills: 0 | Status: ACTIVE | COMMUNITY
Start: 2024-03-18 | End: 1900-01-01

## 2024-03-18 NOTE — PHYSICAL EXAM
[Chaperone Present] : A chaperone was present in the examining room during all aspects of the physical examination [Alert] : alert [No Acute Distress] : no acute distress [Appropriately responsive] : appropriately responsive [Soft] : soft [Non-tender] : non-tender [Non-distended] : non-distended [No Lesions] : no lesions [No HSM] : No HSM [Oriented x3] : oriented x3 [No Mass] : no mass [No Masses] : no breast masses were palpable [Examination Of The Breasts] : a normal appearance [Labia Majora] : normal [Labia Minora] : normal [Normal] : normal [Uterine Adnexae] : normal [FreeTextEntry2] :  1cm bump on the right labia  [FreeTextEntry4] : granulation tissue resolved slight raw

## 2024-03-18 NOTE — HISTORY OF PRESENT ILLNESS
[FreeTextEntry1] : 03/18/2024. SEGUNDO LYNCHPHILLIPJANE 34 year old female presents for follow-up PPA visit,   In the previous visit granulation tissue was noticed in the vagina. She reports a bump in the vagina that has grown in the size.   Pt reports spotting since yesterday.   She mentions being bloated.

## 2024-03-18 NOTE — PLAN
[FreeTextEntry1] : 34 year old female pt presents for f/u due to vaginal granulation tissue:   Right Vulvar boil w/ discharge:  HSV culture and General Vaginal culture done today  Rx given Keflex, R/B/A discussed.  Granulation tissue improved D/w pt to c/w estradiol cream for 1 more week   RTO for annual or PRN      This note was written by Vargas Javier on 03/18/2024 acting as scribe for Dr. Sheela Lariso   I,  Dr. Sheela Larios, have read and attest that all the information, medical decision making and medical instructions within are true and accurate.

## 2024-03-19 LAB
HSV+VZV DNA SPEC QL NAA+PROBE: NOT DETECTED
SPECIMEN SOURCE: NORMAL

## 2024-03-21 LAB — BACTERIA GENITAL AEROBE CULT: NORMAL

## 2024-11-08 NOTE — DISCHARGE NOTE OB - CALL YOUR HEALTHCARE PROVIDER IF YOU ARE EXPERIENCING SYMPTOMS OF DEPRESSION THAT LAST MORE THAN THREE DAYS
Patient admitted for further work-up of hypotension and acute chest pain. Cardiology consulted on admission. Recommended stress test to rule-out concern for underlying ischemia given elevated troponin. Echo showed hyperdynamic systolic function with an EF of 75-80% and mild LVOT obstruction. Stress test without evidence of ischemia. Patient denied further episodes of chest pain, lightheadedness or dizziness during admission. Will instruct patient to hold Carvedilol on discharge as previous pre-syncopal episode likely due to hypotension 2/2 antihypertensives. Cardiology to adjust as needed at follow-up appointment. Mildly elevated Cr noted on labs prior to discharge. Patient instructed to rehydrate with oral rehydration given IVF shortage. Repeat BMP ordered to be collected at PCP follow-up appointment. She understands and agrees to plan. Patient is hemodynamically stable for discharge per Cardiology recommendations with Primary care and Cardiology follow-up appointment. Patient instructed to return to ED in case of shortness of breath, lightheadedness, dizziness, or chest pain.    Statement Selected